# Patient Record
Sex: FEMALE | Race: WHITE | NOT HISPANIC OR LATINO | URBAN - METROPOLITAN AREA
[De-identification: names, ages, dates, MRNs, and addresses within clinical notes are randomized per-mention and may not be internally consistent; named-entity substitution may affect disease eponyms.]

---

## 2017-08-18 ENCOUNTER — OFFICE VISIT (OUTPATIENT)
Dept: SURGERY | Facility: CLINIC | Age: 32
End: 2017-08-18

## 2017-08-18 VITALS
DIASTOLIC BLOOD PRESSURE: 71 MMHG | WEIGHT: 173.2 LBS | TEMPERATURE: 98.4 F | SYSTOLIC BLOOD PRESSURE: 108 MMHG | OXYGEN SATURATION: 100 % | BODY MASS INDEX: 30.69 KG/M2 | HEART RATE: 62 BPM | HEIGHT: 63 IN

## 2017-08-18 DIAGNOSIS — Z15.09 BRCA2 GENETIC CARRIER: Primary | ICD-10-CM

## 2017-08-18 DIAGNOSIS — Z80.3 FH: BREAST CANCER: ICD-10-CM

## 2017-08-18 DIAGNOSIS — Z15.01 BRCA2 GENETIC CARRIER: Primary | ICD-10-CM

## 2017-08-18 PROCEDURE — 99212 OFFICE O/P EST SF 10 MIN: CPT | Performed by: SURGERY

## 2017-08-18 NOTE — PROGRESS NOTES
Chief Complaint: Mirtha Mckay is a 31 y.o. female who was seen in consultation at the request of Martina Rubio DO  for BRCA2 genetic carrier  and a followup visit    History of Present Illness:  Patient presents with a newly diagnosed  mutation in BRCA 2 gene.  This is a mutation caused by a rearrangement in the gene.   She has noted no masses, skin changes, nipple discharge, nipple changes prior to her most recent imaging in either breast  Her most recent imaging includes:  12        [facility]        Mammo Screening Bilateral        Mirtha Mckay   BIRADS 2    13      [facility]       MR breast Bilateral        Mirtha Mckay  There is severs bilateral background parenchymal enhancement.   There is no discrete mass or focal enhancing mass.   There is no non-mass-like enhancement identified. There is no abnormal lymphadenopathy.  IMPRESSION: No evidence to suggest malignancy.  MRI Benign-BIRADS 2    She has not had  a breast biopsy in the past.  She has had her uterus and ovaries removed this year in  , is postmenopausal, and takes no hormones.  Her family history includes the following:  She has one daughter, one sister, one brother, one maternal aunt, 2 maternal uncles, no paternal aunts. Her sister has had testing (Kandy Lima) that is pending, her mother had breast cancer at age 27 and  from this at age 31. Mirtha was 8 when her mother .  Her MA had some sort of cancer after her mother had breast cancer, but she is unsure what kind. Her MGM also had some sort of cancer, but she is unsure as to what kind. One maternal uncle lived in [facility] and is , she is unsure of cause. The other uncle has not had cancer that she is aware of. Her brother has not yet had testing and is unsure as to whether he is interested in this.    No other breast or ovarian cancer in her family.    Since our last visit, Mrs. Mckay has done well. She has noted no changes in her breast exam. No new  masses, skin changes nipple changes, nipple discharge either breast.  She met with Dr Lyles this morning and they discussed bilateral expander reconstruction. SHe liked him quite a bit and they discussed the date of 11-14-13.  No new imaging.  Her sister was tested and found to be negative for mutation.      We went to the operating room on 11-14-13. Pathology from 11-14-13 bilateral risk reducing mastectomy returned as bilateral benign breast tissue , bilateral negative sentinel nodes, LEFT 0/2, RIGHT 0/4.   She reports taking tylenol only after several days.    In the interim, Mrs. Rodriguez has done well. She has had her expanders fully expanded.   She has noted no skin nodules or discolorations.    In the interim,  Mirtha  has done well.  She had her expanders replaced with implants in the summer, then had her NAC reconstruction 2-2016.  She has noted no non-surgical changes in her reconstructed breast exam. No new masses, skin changes,  nodules or discolor ations either breast.   She denies headache, bone pain, belly pain, cough, changes in vision or gait.  She has intentionally lost 10#.      In the interim,  Mirtha Mckay  has done well.  She has noted no changes in her reconstructed breast exam. No new masses, skin changes, nodules or discolorations either side.  She denies headache, bone pain, belly pain, cough, changes in vision or gait.    She has gained 10 pounds.    Her nipple tattooing has faded.      Interval History:  In the interim,  Mirtha Mckay  has done well.  She has noted no changes in her reconstructed breast exam. No new masses, skin changes, either side.   She denies headache, bone pain, belly pain, cough, changes in vision or gait.She     She has gained 9 # in the interim, but tells me that this is 4# down from her heaviest.      Review of Systems:  Review of Systems   Constitutional: Negative.    All other systems reviewed and are negative.       Past Medical and Surgical History:  Breast  Biopsy History:  Patient had not had a breast biopsy prior to her cancer diagnosis. and Patient has not had a breast biopsy in the past.  Breast Cancer HIstory:  Patient does not have a past medical history of breast cancer.  Breast Operations, and year:  As per hpi  Obstetric/Gynecologic History:  Age menstrual periods began:14  Patient is premenopausal  Number of pregnancies:1  Number of live births: 1  Number of abortions or miscarriages: 0  Age of delivery of first child: 23  Patient did not breast feed. and Patient breast fed, for the following lenth of time: pump for 6-8 weeks  Length of time taking birth control pills:1-2 years implanon for 3 years  Patient has never taken hormone replacement      Past Surgical History:   Procedure Laterality Date   • CERVICAL CONE BIOPSY     • CYST REMOVAL     • HYSTERECTOMY  06/06/2013   • TONSILLECTOMY           Past Medical History:   Diagnosis Date   • Gene mutation     brca 2 mutation       Prior Hospitalizations, other than for surgery or childbirth, and year:  none      Social History     Social History   • Marital status:      Spouse name: N/A   • Number of children: N/A   • Years of education: N/A     Occupational History   • Not on file.     Social History Main Topics   • Smoking status: Never Smoker   • Smokeless tobacco: Not on file   • Alcohol use No   • Drug use: No   • Sexual activity: Yes     Partners: Male     Other Topics Concern   • Not on file     Social History Narrative     Patient is .  Patient is employed full time with the following occupation: childcare at   Patient drinks 3-4 servings of caffeine per day.      Family History:  Family History   Problem Relation Age of Onset   • Breast cancer Mother 27   • Lung cancer Mother    • Hypertension Father    • Hypertension Sister    • Cancer Maternal Grandmother    • Stroke Paternal Grandmother    • Diabetes Other    • Cancer Maternal Aunt        Vital Signs:  /71 (BP Location:  "Left arm, Patient Position: Sitting, Cuff Size: Adult)  Pulse 62  Temp 98.4 °F (36.9 °C) (Temporal Artery )   Ht 63\" (160 cm)  Wt 173 lb 3.2 oz (78.6 kg)  SpO2 100%  BMI 30.68 kg/m2     Medications:    Current Outpatient Prescriptions:   •  omeprazole (PriLOSEC) 20 MG capsule, , Disp: , Rfl:   •  Phentermine HCl 15 MG tablet dispersible, Take  by mouth., Disp: , Rfl:      Allergies:  No Known Allergies    Physical Examination:  /71 (BP Location: Left arm, Patient Position: Sitting, Cuff Size: Adult)  Pulse 62  Temp 98.4 °F (36.9 °C) (Temporal Artery )   Ht 63\" (160 cm)  Wt 173 lb 3.2 oz (78.6 kg)  SpO2 100%  BMI 30.68 kg/m2  General Appearance:  Patient is in no distress.  She is well kept and has a  average  build.   Psychiatric:  Patient with appropriate mood and affect. Alert and oriented to self, time, and place.    Breast, RIGHT: surgically absent with implants in place. Mastectomy flaps well healed and well perfused. No nodules or discolorations on the skin.  Nipple areolar complex is reconstructed and tattooed.  The tattooing has faded in random portions of the nipple areolar complex. Stable.    Breast, LEFT:  surgically absent with implants  in place. Mastectomy flaps well healed and well perfused. No nodules or discolorations on the skin.  Nipple areolar complex is reconstructed and tattooed.  The tattooing has faded in random portions of the nipple areolar complex. Stable.      Lymphatic:  There is no axillary, cervical, infraclavicular, or supraclavicular adenopathy bilaterally.  Eyes:  Pupils are round and reactive to light.  Cardiovascular:  Heart rate and rhythm are regular.  Respiratory:  Lungs are clear bilaterally with no crackles or wheezes in any lung field.  Gastrointestinal:  Abdomen is soft, nondistended, and nontender. There are scars visible from previous operations, including  well healed trocar incisions from her [facility]..   Musculoskeletal:  Good strength in all 4 " "extremities.  There is good range of motion in both shoulders.   Skin:  No new skin lesions or rashes on the skin excluding the breast (see breast exam above).        Imagin12        [facility]        Mammo Screening Bilateral        Mirtha Mckay   BIRADS 2    13      [facility]       MR breast Bilateral        Mirhta Mckay  There is severs bilateral background parenchymal enhancement.   There is no discrete mass or focal enhancing mass.   There is no non-mass-like enhancement identified. There is no abnormal lymphadenopathy.  IMPRESSION: No evidence to suggest malignancy.  MRI Benign-BIRADS 2        Pathology:  11/15/2013  BILATERAL TOTAL MASTECTOMY- RISK REDUCING BILAT DEEP AXILLARY SENTINEL  NODE BIOPSY  Final Diagnosis  1:  SENTINEL LYMPH NODE #1, LEFT AXILLA, EXCISIONAL SPECIMEN (ONE NODE):       NO TUMOR IDENTIFIED.    2:  SENTINEL LYMPH NODE #2, LEFT AXILLA, EXCISIONAL SPECIMEN (ONE NODE):       NO TUMOR IDENTIFIED.    3:  SENTINEL LYMPH NODE #1, RIGHT AXILLA, EXCISIONAL SPECIMEN (TWO NODES):       SMALLER NODE: NO TUMOR IDENTIFIED.            LARGER NODE: NO TUMOR IDENTIFIED.    4:  SENTINEL LYMPH NODE #2, RIGHT AXILLA, EXCISIONAL SPECIMEN (TWO NODES):       SMALLER NODE: NO TUMOR IDENTIFIED.       LARGER NODE: NO TUMOR IDENTIFIED.    COMMENT: Step sections and routine stains have been utilized on all nodes.     Procedures/Addenda  SUPPLEMENTAL REPORT     Date Ordered:     11/15/2013     Status:  Signed Out       Date Complete:     11/15/2013     By:  TONNY MOON       Date Reported:     2013             Addendum Diagnosis  5: \"RISK REDUCING RIGHT TOTAL MASTECTOMY\":       MINIMAL FIBROSIS.       NEGATIVE SKIN / NIPPLE.    6 \"RISK REDUCING LEFT TOTAL MASTECTOMY\":       MINIMAL FIBROSIS.       NEGATIVE SKIN / NIPPLE.    Labs:    12      [facility]     Comprehensive BRACAnalysis        Mirtha Mckay   NO MUTATION DETECTED    12      [facility]         BRACAnalysis Rearrangement Test    "   Mirtha Mckay   POSITIVE FOR A DELETERIOUS MUTATION  BRCA2 Full gene rearrangement Deleterious      Procedures:      Assessment:   Diagnosis Plan   1. BRCA2 genetic carrier     2. FH: breast cancer          1-  BRCA 2 rearrangement deleterious mutation 12. Pathology from 13 bilateral risk reducing mastectomy returned as bilateral benign breast tissue , bilateral negative sentinel nodes, LEFT 0/2, RIGHT 0/4.  Reconstruction Dr. Banks, gel implants.  - LEONARDPHILLIP, Dr. Rubio    2-  mother, age 27,  from this at age 31        Plan:  Mirtha Mckay and I reviewed her interval history, exam,  together today. Her exam is in good order.    Today is her 3 and a half year postoperative visit.  I will see her back in 1 year with no imaging.   She has had her uterus and ovaries removed.    I have asked her to continue her self exam monthly  in addition to annual clinical breast exam and to call in the interim with concerns or changes.      Zoya Vásquez MD        Next Appointment:  Return for Next scheduled follow up, no imaging.      EMR Dragon/transcription disclaimer:    Much of this encounter note is an electronic transcription/translocation of spoken language to printed text.  The electronic translation of spoken language may permit erroneous, or at times, nonsensical words or phrases to be inadvertently transcribed.  Although I have reviewed the note from such areas, some may still exist.

## 2018-08-21 ENCOUNTER — TELEPHONE (OUTPATIENT)
Dept: SURGERY | Facility: CLINIC | Age: 33
End: 2018-08-21

## 2018-08-24 ENCOUNTER — OFFICE VISIT (OUTPATIENT)
Dept: SURGERY | Facility: CLINIC | Age: 33
End: 2018-08-24

## 2018-08-24 VITALS
WEIGHT: 187 LBS | OXYGEN SATURATION: 97 % | HEART RATE: 70 BPM | HEIGHT: 63 IN | SYSTOLIC BLOOD PRESSURE: 128 MMHG | DIASTOLIC BLOOD PRESSURE: 76 MMHG | BODY MASS INDEX: 33.13 KG/M2

## 2018-08-24 DIAGNOSIS — Z15.09 BRCA2 GENETIC CARRIER: Primary | ICD-10-CM

## 2018-08-24 DIAGNOSIS — Z80.3 FH: BREAST CANCER: ICD-10-CM

## 2018-08-24 DIAGNOSIS — Z15.01 BRCA2 GENETIC CARRIER: Primary | ICD-10-CM

## 2018-08-24 PROCEDURE — 99212 OFFICE O/P EST SF 10 MIN: CPT | Performed by: SURGERY

## 2018-08-24 NOTE — PROGRESS NOTES
Chief Complaint: Mirtha Mckay is a 32 y.o. female who was seen in consultation at the request of Martina Rubio DO  for BRCA2 genetic carrier  and a followup visit    History of Present Illness:  Patient presents with a newly diagnosed  mutation in BRCA 2 gene.  This is a mutation caused by a rearrangement in the gene.   She has noted no masses, skin changes, nipple discharge, nipple changes prior to her most recent imaging in either breast  Her most recent imaging includes:  12        [facility]        Mammo Screening Bilateral        Mirtha Mckay   BIRADS 2    13      [facility]       MR breast Bilateral        Mirtha Mckay  There is severs bilateral background parenchymal enhancement.   There is no discrete mass or focal enhancing mass.   There is no non-mass-like enhancement identified. There is no abnormal lymphadenopathy.  IMPRESSION: No evidence to suggest malignancy.  MRI Benign-BIRADS 2    She has not had  a breast biopsy in the past.  She has had her uterus and ovaries removed this year in  , is postmenopausal, and takes no hormones.  Her family history includes the following:  She has one daughter, one sister, one brother, one maternal aunt, 2 maternal uncles, no paternal aunts. Her sister has had testing (Kandy Lima) that is pending, her mother had breast cancer at age 27 and  from this at age 31. Mirtha was 8 when her mother .  Her MA had some sort of cancer after her mother had breast cancer, but she is unsure what kind. Her MGM also had some sort of cancer, but she is unsure as to what kind. One maternal uncle lived in [place] and is , she is unsure of cause. The other uncle has not had cancer that she is aware of. Her brother has not yet had testing and is unsure as to whether he is interested in this.    No other breast or ovarian cancer in her family.    Since our last visit, Mrs. Mckay has done well. She has noted no changes in her breast exam. No new  masses, skin changes nipple changes, nipple discharge either breast.  She met with Dr Lyles this morning and they discussed bilateral expander reconstruction. SHe liked him quite a bit and they discussed the date of 11-14-13.  No new imaging.  Her sister was tested and found to be negative for mutation.      We went to the operating room on 11-14-13. Pathology from 11-14-13 bilateral risk reducing mastectomy returned as bilateral benign breast tissue , bilateral negative sentinel nodes, LEFT 0/2, RIGHT 0/4.   She reports taking tylenol only after several days.    In the interim, Mrs. Rodriguez has done well. She has had her expanders fully expanded.   She has noted no skin nodules or discolorations.    In the interim,  Mirtha  has done well.  She had her expanders replaced with implants in the summer, then had her NAC reconstruction 2-2016.  She has noted no non-surgical changes in her reconstructed breast exam. No new masses, skin changes,  nodules or discolor ations either breast.   She denies headache, bone pain, belly pain, cough, changes in vision or gait.  She has intentionally lost 10#.      In the interim,  Mirtha Mckay  has done well.  She has noted no changes in her reconstructed breast exam. No new masses, skin changes, nodules or discolorations either side.  She denies headache, bone pain, belly pain, cough, changes in vision or gait.    She has gained 10 pounds.    Her nipple tattooing has faded.    In the interim,  Mirtha Mckay  has done well.  She has noted no changes in her reconstructed breast exam. No new masses, skin changes, either side.   She denies headache, bone pain, belly pain, cough, changes in vision or gait.She     She has gained 9 # in the interim, but tells me that this is 4# down from her heaviest.        Interval History:  In the interim,  Mirtha Mckay  has done well. She had her NAC tattoed at a tattoo parlor in [facility] called [facility], artist named Christian.    She has noted no changes in  her reconstructed breast exam. No new masses, nodules or discolorations either side.    She denies headache, bone pain, belly pain, cough, changes in vision or gait.      Review of Systems:  Review of Systems   Constitutional: Positive for unexpected weight change (14 lb wt gain).   All other systems reviewed and are negative.       Past Medical and Surgical History:  Breast Biopsy History:  Patient had not had a breast biopsy prior to her cancer diagnosis. and Patient has not had a breast biopsy in the past.  Breast Cancer HIstory:  Patient does not have a past medical history of breast cancer.  Breast Operations, and year:  As per hpi  Obstetric/Gynecologic History:  Age menstrual periods began:14  Patient is premenopausal  Number of pregnancies:1  Number of live births: 1  Number of abortions or miscarriages: 0  Age of delivery of first child: 23  Patient did not breast feed. and Patient breast fed, for the following lenth of time: pump for 6-8 weeks  Length of time taking birth control pills:1-2 years implanon for 3 years  Patient has never taken hormone replacement      Past Surgical History:   Procedure Laterality Date   • CERVICAL CONE BIOPSY     • CYST REMOVAL     • HYSTERECTOMY  06/06/2013   • TONSILLECTOMY           Past Medical History:   Diagnosis Date   • Gene mutation     brca 2 mutation       Prior Hospitalizations, other than for surgery or childbirth, and year:  none      Social History     Social History   • Marital status:      Spouse name: N/A   • Number of children: N/A   • Years of education: N/A     Occupational History   • Not on file.     Social History Main Topics   • Smoking status: Never Smoker   • Smokeless tobacco: Not on file   • Alcohol use No   • Drug use: No   • Sexual activity: Yes     Partners: Male     Other Topics Concern   • Not on file     Social History Narrative   • No narrative on file     Patient is .  Patient is employed full time with the following  "occupation: childcare at   Patient drinks 3-4 servings of caffeine per day.      Family History:  Family History   Problem Relation Age of Onset   • Breast cancer Mother 27   • Lung cancer Mother    • Hypertension Father    • Hypertension Sister    • Cancer Maternal Grandmother    • Stroke Paternal Grandmother    • Diabetes Other    • Cancer Maternal Aunt        Vital Signs:  /76   Pulse 70   Ht 160 cm (63\")   Wt 84.8 kg (187 lb)   SpO2 97%   BMI 33.13 kg/m²      Medications:    Current Outpatient Prescriptions:   •  omeprazole (PriLOSEC) 20 MG capsule, , Disp: , Rfl:      Allergies:  No Known Allergies    Physical Examination:  /76   Pulse 70   Ht 160 cm (63\")   Wt 84.8 kg (187 lb)   SpO2 97%   BMI 33.13 kg/m²   General Appearance:  Patient is in no distress.  She is well kept and has a  average  build.   Psychiatric:  Patient with appropriate mood and affect. Alert and oriented to self, time, and place.    Breast, RIGHT: surgically absent with implants in place. Mastectomy flaps well healed and well perfused. No nodules or discolorations on the skin.  Nipple areolar complex is reconstructed and tattooed. She has a color/pigment  tattoo on the recontructed NAC, but also an intricate floral tattoo hiding her inferior incisions.    Breast, LEFT:  surgically absent with implants  in place. Mastectomy flaps well healed and well perfused. No nodules or discolorations on the skin.  Nipple areolar complex is reconstructed and tattooed. She has a color/pigment  tattoo on the recontructed NAC, but also an intricate floral tattoo hiding her inferior incisions.    Lymphatic:  There is no axillary, cervical, infraclavicular, or supraclavicular adenopathy bilaterally.  Eyes:  Pupils are round and reactive to light.  Cardiovascular:  Heart rate and rhythm are regular.  Respiratory:  Lungs are clear bilaterally with no crackles or wheezes in any lung field.  Gastrointestinal:  Abdomen is soft, " "nondistended, and nontender. There are scars visible from previous operations, including  well healed trocar incisions from her TAHBSO..   Musculoskeletal:  Good strength in all 4 extremities.  There is good range of motion in both shoulders.   Skin:  No new skin lesions or rashes on the skin excluding the breast (see breast exam above).        Imagin12        [facility]        Mammo Screening Bilateral        Mirtha Mckay   BIRADS 2    13      [facility]       MR breast Bilateral        Mirtha Mckay  There is severs bilateral background parenchymal enhancement.   There is no discrete mass or focal enhancing mass.   There is no non-mass-like enhancement identified. There is no abnormal lymphadenopathy.  IMPRESSION: No evidence to suggest malignancy.  MRI Benign-BIRADS 2        Pathology:  11/15/2013  BILATERAL TOTAL MASTECTOMY- RISK REDUCING BILAT DEEP AXILLARY SENTINEL  NODE BIOPSY  Final Diagnosis  1:  SENTINEL LYMPH NODE #1, LEFT AXILLA, EXCISIONAL SPECIMEN (ONE NODE):       NO TUMOR IDENTIFIED.    2:  SENTINEL LYMPH NODE #2, LEFT AXILLA, EXCISIONAL SPECIMEN (ONE NODE):       NO TUMOR IDENTIFIED.    3:  SENTINEL LYMPH NODE #1, RIGHT AXILLA, EXCISIONAL SPECIMEN (TWO NODES):       SMALLER NODE: NO TUMOR IDENTIFIED.            LARGER NODE: NO TUMOR IDENTIFIED.    4:  SENTINEL LYMPH NODE #2, RIGHT AXILLA, EXCISIONAL SPECIMEN (TWO NODES):       SMALLER NODE: NO TUMOR IDENTIFIED.       LARGER NODE: NO TUMOR IDENTIFIED.    COMMENT: Step sections and routine stains have been utilized on all nodes.     Procedures/Addenda  SUPPLEMENTAL REPORT     Date Ordered:     11/15/2013     Status:  Signed Out       Date Complete:     11/15/2013     By:  TONNY MOON       Date Reported:     2013             Addendum Diagnosis  5: \"RISK REDUCING RIGHT TOTAL MASTECTOMY\":       MINIMAL FIBROSIS.       NEGATIVE SKIN / NIPPLE.    6 \"RISK REDUCING LEFT TOTAL MASTECTOMY\":       MINIMAL FIBROSIS.       NEGATIVE SKIN / " NIPPLE.    Labs:    12      [facility]     Comprehensive BRACAnalysis        Mirtha Mckay   NO MUTATION DETECTED    12      [facility]         BRACAnalysis Rearrangement Test      Mirtha Mckay   POSITIVE FOR A DELETERIOUS MUTATION  BRCA2 Full gene rearrangement Deleterious      Procedures:      Assessment:   Diagnosis Plan   1. BRCA2 genetic carrier     2. FH: breast cancer          1-  BRCA 2 rearrangement deleterious mutation 12. Pathology from 13 bilateral risk reducing mastectomy returned as bilateral benign breast tissue , bilateral negative sentinel nodes, LEFT 0/2, RIGHT 0/4.  Reconstruction Dr. Banks, gel implants.  - TAHBSO, Dr. Rubio    2-  mother, age 27,  from this at age 31        Plan:  Mirtha Mckay and I reviewed her interval history, exam,  together today. Her exam is in good order.    Today is her nearly 5 year postoperative visit.  I will see her back in 1 year with no imaging.   She has had her uterus and ovaries removed. I did ask her to followup with Dr Rubio for her ovarian-primary peritoneal cancer risk.    I have asked her to continue her self exam monthly  in addition to annual clinical breast exam and to call in the interim with concerns or changes.      Zoya Vásquez MD        Next Appointment:  Return in about 1 year (around 2019) for no imaging.      EMR Dragon/transcription disclaimer:    Much of this encounter note is an electronic transcription/translocation of spoken language to printed text.  The electronic translation of spoken language may permit erroneous, or at times, nonsensical words or phrases to be inadvertently transcribed.  Although I have reviewed the note from such areas, some may still exist.

## 2019-09-06 ENCOUNTER — OFFICE VISIT (OUTPATIENT)
Dept: SURGERY | Facility: CLINIC | Age: 34
End: 2019-09-06

## 2019-09-06 VITALS
HEART RATE: 84 BPM | OXYGEN SATURATION: 98 % | WEIGHT: 195 LBS | SYSTOLIC BLOOD PRESSURE: 146 MMHG | BODY MASS INDEX: 34.55 KG/M2 | HEIGHT: 63 IN | DIASTOLIC BLOOD PRESSURE: 82 MMHG

## 2019-09-06 DIAGNOSIS — Z15.09 BRCA2 GENETIC CARRIER: Primary | ICD-10-CM

## 2019-09-06 DIAGNOSIS — Z80.3 FH: BREAST CANCER: ICD-10-CM

## 2019-09-06 DIAGNOSIS — Z15.01 BRCA2 GENETIC CARRIER: Primary | ICD-10-CM

## 2019-09-06 PROCEDURE — 99212 OFFICE O/P EST SF 10 MIN: CPT | Performed by: SURGERY

## 2019-09-06 NOTE — PROGRESS NOTES
Chief Complaint: Mirtha Mckay is a 33 y.o. female who was seen in consultation at the request of  Martina Rubio DO   for BRCA2 genetic carrier  and a followup visit    History of Present Illness:  Patient presents with a newly diagnosed  mutation in BRCA 2 gene.  This is a mutation caused by a rearrangement in the gene.   She has noted no masses, skin changes, nipple discharge, nipple changes prior to her most recent imaging in either breast  Her most recent imaging includes:  12        [facility]        Mammo Screening Bilateral        Mirtha Mckay   BIRADS 2    13      [facility]       MR breast Bilateral        Mirtha Mckay  There is severs bilateral background parenchymal enhancement.   There is no discrete mass or focal enhancing mass.   There is no non-mass-like enhancement identified. There is no abnormal lymphadenopathy.  IMPRESSION: No evidence to suggest malignancy.  MRI Benign-BIRADS 2    She has not had  a breast biopsy in the past.  She has had her uterus and ovaries removed this year in  , is postmenopausal, and takes no hormones.  Her family history includes the following:  She has one daughter, one sister, one brother, one maternal aunt, 2 maternal uncles, no paternal aunts. Her sister has had testing (Kandy Lima) that is pending, her mother had breast cancer at age 27 and  from this at age 31. Mirtha was 8 when her mother .  Her MA had some sort of cancer after her mother had breast cancer, but she is unsure what kind. Her MGM also had some sort of cancer, but she is unsure as to what kind. One maternal uncle lived in [facility] and is , she is unsure of cause. The other uncle has not had cancer that she is aware of. Her brother has not yet had testing and is unsure as to whether he is interested in this.    No other breast or ovarian cancer in her family.    Since our last visit, Mrs. Mckay has done well. She has noted no changes in her breast exam. No new  masses, skin changes nipple changes, nipple discharge either breast.  She met with Dr Lyles this morning and they discussed bilateral expander reconstruction. SHe liked him quite a bit and they discussed the date of 11-14-13.  No new imaging.  Her sister was tested and found to be negative for mutation.      We went to the operating room on 11-14-13. Pathology from 11-14-13 bilateral risk reducing mastectomy returned as bilateral benign breast tissue , bilateral negative sentinel nodes, LEFT 0/2, RIGHT 0/4.   She reports taking tylenol only after several days.    In the interim, Mrs. Rodriguez has done well. She has had her expanders fully expanded.   She has noted no skin nodules or discolorations.    In the interim,  Mirtha  has done well.  She had her expanders replaced with implants in the summer, then had her NAC reconstruction 2-2016.  She has noted no non-surgical changes in her reconstructed breast exam. No new masses, skin changes,  nodules or discolor ations either breast.   She denies headache, bone pain, belly pain, cough, changes in vision or gait.  She has intentionally lost 10#.      In the interim,  Mirtha Mckay  has done well.  She has noted no changes in her reconstructed breast exam. No new masses, skin changes, nodules or discolorations either side.  She denies headache, bone pain, belly pain, cough, changes in vision or gait.    She has gained 10 pounds.    Her nipple tattooing has faded.    In the interim,  Mirtha Mckay  has done well.  She has noted no changes in her reconstructed breast exam. No new masses, skin changes, either side.   She denies headache, bone pain, belly pain, cough, changes in vision or gait.She     She has gained 9 # in the interim, but tells me that this is 4# down from her heaviest.      In the interim,  Mirtha Mckay  has done well. She had her NAC tattoed at a tattoo parlor in [facility] [address] called [employer], artist named Christian.    She has noted no changes in her reconstructed  breast exam. No new masses, nodules or discolorations either side.    She denies headache, bone pain, belly pain, cough, changes in vision or gait.    Interval History:      In the interim,  Mirtha Mckay  has done well.  She has noted no changes in her reconstructed breast exam. No new masses, nodules or discolorations either side.    She denies headache, bone pain, belly pain, cough, changes in vision or gait.  SHe has gained 7#.      Review of Systems:  Review of Systems   Constitutional: Positive for unexpected weight change (8 lb wt gain).   All other systems reviewed and are negative.       Past Medical and Surgical History:  Breast Biopsy History:  Patient had not had a breast biopsy prior to her cancer diagnosis. and Patient has not had a breast biopsy in the past.  Breast Cancer HIstory:  Patient does not have a past medical history of breast cancer.  Breast Operations, and year:  As per [facility]  Obstetric/Gynecologic History:  Age menstrual periods began:14  Patient is premenopausal  Number of pregnancies:1  Number of live births: 1  Number of abortions or miscarriages: 0  Age of delivery of first child: 23  Patient did not breast feed. and Patient breast fed, for the following lenth of time: pump for 6-8 weeks  Length of time taking birth control pills:1-2 years implanon for 3 years  Patient has never taken hormone replacement      Past Surgical History:   Procedure Laterality Date   • CERVICAL CONE BIOPSY     • CYST REMOVAL     • HYSTERECTOMY  06/06/2013   • TONSILLECTOMY           Past Medical History:   Diagnosis Date   • Gene mutation     brca 2 mutation       Prior Hospitalizations, other than for surgery or childbirth, and year:  none      Social History     Socioeconomic History   • Marital status:      Spouse name: Not on file   • Number of children: Not on file   • Years of education: Not on file   • Highest education level: Not on file   Tobacco Use   • Smoking status: Never Smoker  "  Substance and Sexual Activity   • Alcohol use: No   • Drug use: No   • Sexual activity: Yes     Partners: Male     Patient is .  Patient is employed full time with the following occupation: childcare at   Patient drinks 3-4 servings of caffeine per day.      Family History:  Family History   Problem Relation Age of Onset   • Breast cancer Mother 27   • Lung cancer Mother    • Hypertension Father    • Hypertension Sister    • Cancer Maternal Grandmother    • Stroke Paternal Grandmother    • Diabetes Other    • Cancer Maternal Aunt        Vital Signs:  /82 (BP Location: Right arm, Patient Position: Sitting, Cuff Size: Adult)   Pulse 84   Ht 160 cm (63\")   Wt 88.5 kg (195 lb)   LMP  (LMP Unknown)   SpO2 98%   Breastfeeding? No   BMI 34.54 kg/m²      Medications:    Current Outpatient Medications:   •  omeprazole (PriLOSEC) 20 MG capsule, , Disp: , Rfl:      Allergies:  No Known Allergies    Physical Examination:  /82 (BP Location: Right arm, Patient Position: Sitting, Cuff Size: Adult)   Pulse 84   Ht 160 cm (63\")   Wt 88.5 kg (195 lb)   LMP  (LMP Unknown)   SpO2 98%   Breastfeeding? No   BMI 34.54 kg/m²   General Appearance:  Patient is in no distress.  She is well kept and has a  average  build.   Psychiatric:  Patient with appropriate mood and affect. Alert and oriented to self, time, and place.    Breast, RIGHT: surgically absent with implants in place. Mastectomy flaps well healed and well perfused. No nodules or discolorations on the skin.  Nipple areolar complex is reconstructed and tattooed. She has a color/pigment  tattoo on the recontructed NAC, but also an intricate floral tattoo hiding her inferior incisions.    Breast, LEFT:  surgically absent with implants  in place. Mastectomy flaps well healed and well perfused. No nodules or discolorations on the skin.  Nipple areolar complex is reconstructed and tattooed. She has a color/pigment  tattoo on the recontructed " NAC, but also an intricate floral tattoo hiding her inferior incisions.    Lymphatic:  There is no axillary, cervical, infraclavicular, or supraclavicular adenopathy bilaterally.  Eyes:  Pupils are round and reactive to light.  Cardiovascular:  Heart rate and rhythm are regular.  Respiratory:  Lungs are clear bilaterally with no crackles or wheezes in any lung field.  Gastrointestinal:  Abdomen is soft, nondistended, and nontender. There are scars visible from previous operations, including  well healed trocar incisions from her TAHBSO..   Musculoskeletal:  Good strength in all 4 extremities.  There is good range of motion in both shoulders.   Skin:  No new skin lesions or rashes on the skin excluding the breast (see breast exam above).        Imagin12        [facility]        Mammo Screening Bilateral        Mirtha Mckay   BIRADS 2    13      [facility]       MR breast Bilateral        Mirtha Mckay  There is severs bilateral background parenchymal enhancement.   There is no discrete mass or focal enhancing mass.   There is no non-mass-like enhancement identified. There is no abnormal lymphadenopathy.  IMPRESSION: No evidence to suggest malignancy.  MRI Benign-BIRADS 2        Pathology:  11/15/2013  BILATERAL TOTAL MASTECTOMY- RISK REDUCING BILAT DEEP AXILLARY SENTINEL  NODE BIOPSY  Final Diagnosis  1:  SENTINEL LYMPH NODE #1, LEFT AXILLA, EXCISIONAL SPECIMEN (ONE NODE):       NO TUMOR IDENTIFIED.    2:  SENTINEL LYMPH NODE #2, LEFT AXILLA, EXCISIONAL SPECIMEN (ONE NODE):       NO TUMOR IDENTIFIED.    3:  SENTINEL LYMPH NODE #1, RIGHT AXILLA, EXCISIONAL SPECIMEN (TWO NODES):       SMALLER NODE: NO TUMOR IDENTIFIED.            LARGER NODE: NO TUMOR IDENTIFIED.    4:  SENTINEL LYMPH NODE #2, RIGHT AXILLA, EXCISIONAL SPECIMEN (TWO NODES):       SMALLER NODE: NO TUMOR IDENTIFIED.       LARGER NODE: NO TUMOR IDENTIFIED.    COMMENT: Step sections and routine stains have been utilized on all nodes.    "  Procedures/Addenda  SUPPLEMENTAL REPORT     Date Ordered:     11/15/2013     Status:  Signed Out       Date Complete:     11/15/2013     By:  TONNY MOON       Date Reported:     2013             Addendum Diagnosis  5: \"RISK REDUCING RIGHT TOTAL MASTECTOMY\":       MINIMAL FIBROSIS.       NEGATIVE SKIN / NIPPLE.    6 \"RISK REDUCING LEFT TOTAL MASTECTOMY\":       MINIMAL FIBROSIS.       NEGATIVE SKIN / NIPPLE.    Labs:    12      [facility]     Comprehensive BRACAnalysis        Mirtha Mckay   NO MUTATION DETECTED    12      [facility]         BRACAnalysis Rearrangement Test      Mirtha Mckay   POSITIVE FOR A DELETERIOUS MUTATION  BRCA2 Full gene rearrangement Deleterious      Procedures:      Assessment:   Diagnosis Plan   1. BRCA2 genetic carrier     2. FH: breast cancer          1-  BRCA 2 rearrangement deleterious mutation 12. Pathology from 13 bilateral risk reducing mastectomy returned as bilateral benign breast tissue , bilateral negative sentinel nodes, LEFT 0/2, RIGHT 0/4.  Reconstruction Dr. Banks, gel implants.  - [facility], Dr. Rubio    2-  mother, age 27,  from this at age 31        Plan:  Mirtha DURAND Nely and I reviewed her interval history, exam,  together today. Her exam is in good order.    Today is her nearly 6 year postoperative visit.  I will see her back in 1 year with no imaging.   She has had her uterus and ovaries removed. I did ask her to followup with Dr Rubio for her ovarian-primary peritoneal cancer risk.    I have asked her to continue her self exam monthly  in addition to annual clinical breast exam and to call in the interim with concerns or changes.      Zoya Vásquez MD        Next Appointment:  Return in about 1 year (around 2020) for no imaging.      EMR Dragon/transcription disclaimer:    Much of this encounter note is an electronic transcription/translocation of spoken language to printed text.  The electronic translation of spoken " language may permit erroneous, or at times, nonsensical words or phrases to be inadvertently transcribed.  Although I have reviewed the note from such areas, some may still exist.

## 2020-09-01 ENCOUNTER — OFFICE VISIT (OUTPATIENT)
Dept: SURGERY | Facility: CLINIC | Age: 35
End: 2020-09-01

## 2020-09-01 VITALS
BODY MASS INDEX: 33.84 KG/M2 | WEIGHT: 191 LBS | HEART RATE: 90 BPM | OXYGEN SATURATION: 98 % | DIASTOLIC BLOOD PRESSURE: 81 MMHG | SYSTOLIC BLOOD PRESSURE: 120 MMHG | TEMPERATURE: 98 F | HEIGHT: 63 IN

## 2020-09-01 DIAGNOSIS — Z15.01 BRCA2 GENETIC CARRIER: Primary | ICD-10-CM

## 2020-09-01 DIAGNOSIS — Z80.3 FH: BREAST CANCER: ICD-10-CM

## 2020-09-01 DIAGNOSIS — Z15.09 BRCA2 GENETIC CARRIER: Primary | ICD-10-CM

## 2020-09-01 PROCEDURE — 99212 OFFICE O/P EST SF 10 MIN: CPT | Performed by: SURGERY

## 2020-09-01 NOTE — PROGRESS NOTES
Chief Complaint: Mirtha Mckay is a 34 y.o. female who was seen in consultation at the request of  Martina Rubio DO   for BRCA2 genetic carrier  and a followup visit    History of Present Illness:  Patient presents with a newly diagnosed  mutation in BRCA 2 gene.  This is a mutation caused by a rearrangement in the gene.   She has noted no masses, skin changes, nipple discharge, nipple changes prior to her most recent imaging in either breast  Her most recent imaging includes:  12        [facility]        Mammo Screening Bilateral        Mirtha Mckay   BIRADS 2    13      [facility]       MR breast Bilateral        Mirtha Mckay  There is severs bilateral background parenchymal enhancement.   There is no discrete mass or focal enhancing mass.   There is no non-mass-like enhancement identified. There is no abnormal lymphadenopathy.  IMPRESSION: No evidence to suggest malignancy.  MRI Benign-BIRADS 2    She has not had  a breast biopsy in the past.  She has had her uterus and ovaries removed this year in  , is postmenopausal, and takes no hormones.  Her family history includes the following:  She has one daughter, one sister, one brother, one maternal aunt, 2 maternal uncles, no paternal aunts. Her sister has had testing (Kandy Lima) that is pending, her mother had breast cancer at age 27 and  from this at age 31. Mirtha was 8 when her mother .  Her MA had some sort of cancer after her mother had breast cancer, but she is unsure what kind. Her MGM also had some sort of cancer, but she is unsure as to what kind. One maternal uncle lived in [facility] and is , she is unsure of cause. The other uncle has not had cancer that she is aware of. Her brother has not yet had testing and is unsure as to whether he is interested in this.    No other breast or ovarian cancer in her family.    Since our last visit, Mrs. Mckay has done well. She has noted no changes in her breast exam. No new  masses, skin changes nipple changes, nipple discharge either breast.  She met with Dr Lyles this morning and they discussed bilateral expander reconstruction. SHe liked him quite a bit and they discussed the date of 11-14-13.  No new imaging.  Her sister was tested and found to be negative for mutation.      We went to the operating room on 11-14-13. Pathology from 11-14-13 bilateral risk reducing mastectomy returned as bilateral benign breast tissue , bilateral negative sentinel nodes, LEFT 0/2, RIGHT 0/4.   She reports taking tylenol only after several days.    In the interim, Mrs. Rodriguez has done well. She has had her expanders fully expanded.   She has noted no skin nodules or discolorations.    In the interim,  Mirtha  has done well.  She had her expanders replaced with implants in the summer, then had her NAC reconstruction 2-2016.  She has noted no non-surgical changes in her reconstructed breast exam. No new masses, skin changes,  nodules or discolor ations either breast.   She denies headache, bone pain, belly pain, cough, changes in vision or gait.  She has intentionally lost 10#.      In the interim,  Mirtha Mckay  has done well.  She has noted no changes in her reconstructed breast exam. No new masses, skin changes, nodules or discolorations either side.  She denies headache, bone pain, belly pain, cough, changes in vision or gait.    She has gained 10 pounds.    Her nipple tattooing has faded.    In the interim,  Mirtha Mckay  has done well.  She has noted no changes in her reconstructed breast exam. No new masses, skin changes, either side.   She denies headache, bone pain, belly pain, cough, changes in vision or gait.She     She has gained 9 # in the interim, but tells me that this is 4# down from her heaviest.      In the interim,  Mirtha Mckay  has done well. She had her NAC tattoed at a tattoo parlor in [facility] called [employer], artist named Christian.    She has noted no changes in her reconstructed  breast exam. No new masses, nodules or discolorations either side.    She denies headache, bone pain, belly pain, cough, changes in vision or gait.          In the interim,  Mirtha Mckay  has done well.  She has noted no changes in her reconstructed breast exam. No new masses, nodules or discolorations either side.    She denies headache, bone pain, belly pain, cough, changes in vision or gait.  SHe has gained 7#.    Interval History:  In the interim,  Mirtha Mckay  has done well.  She has noted no changes in her reconstructed breast exam. No new masses, nodules or discolorations either side.    She denies headache, bone pain, belly pain, cough, changes in vision or gait.  She has lost 4 #.      Review of Systems:  Review of Systems   Constitutional: Negative for unexpected weight change (4 LB WT LOSS).   All other systems reviewed and are negative.       Past Medical and Surgical History:  Breast Biopsy History:  Patient had not had a breast biopsy prior to her cancer diagnosis. and Patient has not had a breast biopsy in the past.  Breast Cancer HIstory:  Patient does not have a past medical history of breast cancer.  Breast Operations, and year:  As per hpi  Obstetric/Gynecologic History:  Age menstrual periods began:14  Patient is premenopausal  Number of pregnancies:1  Number of live births: 1  Number of abortions or miscarriages: 0  Age of delivery of first child: 23  Patient did not breast feed. and Patient breast fed, for the following lenth of time: pump for 6-8 weeks  Length of time taking birth control pills:1-2 years implanon for 3 years  Patient has never taken hormone replacement      Past Surgical History:   Procedure Laterality Date   • CERVICAL CONE BIOPSY     • CYST REMOVAL     • HYSTERECTOMY  06/06/2013   • TONSILLECTOMY           Past Medical History:   Diagnosis Date   • Gene mutation     brca 2 mutation       Prior Hospitalizations, other than for surgery or childbirth, and  "year:  none      Social History     Socioeconomic History   • Marital status:      Spouse name: Not on file   • Number of children: Not on file   • Years of education: Not on file   • Highest education level: Not on file   Tobacco Use   • Smoking status: Never Smoker   Substance and Sexual Activity   • Alcohol use: No   • Drug use: No   • Sexual activity: Yes     Partners: Male     Patient is .  Patient is employed full time with the following occupation: childcare at   Patient drinks 3-4 servings of caffeine per day.      Family History:  Family History   Problem Relation Age of Onset   • Breast cancer Mother 27   • Lung cancer Mother    • Hypertension Father    • Hypertension Sister    • Cancer Maternal Grandmother    • Stroke Paternal Grandmother    • Diabetes Other    • Cancer Maternal Aunt        Vital Signs:  /81   Pulse 90   Temp 98 °F (36.7 °C)   Ht 160 cm (63\")   Wt 86.6 kg (191 lb)   LMP  (LMP Unknown)   SpO2 98%   Breastfeeding No   BMI 33.83 kg/m²      Medications:    Current Outpatient Medications:   •  omeprazole (PriLOSEC) 20 MG capsule, , Disp: , Rfl:      Allergies:  No Known Allergies    Physical Examination:  /81   Pulse 90   Temp 98 °F (36.7 °C)   Ht 160 cm (63\")   Wt 86.6 kg (191 lb)   LMP  (LMP Unknown)   SpO2 98%   Breastfeeding No   BMI 33.83 kg/m²   General Appearance:  Patient is in no distress.  She is well kept and has a  average  build.   Psychiatric:  Patient with appropriate mood and affect. Alert and oriented to self, time, and place.    Breast, RIGHT: surgically absent with implants in place. Mastectomy flaps well healed and well perfused. No nodules or discolorations on the skin.  Nipple areolar complex is reconstructed and tattooed. She has a color/pigment  tattoo on the recontructed NAC, but also an intricate floral tattoo hiding her inferior incisions. Dr Lyles did her NAC reconstruction and a  did the " pigment.    Breast, LEFT:  surgically absent with implants  in place. Mastectomy flaps well healed and well perfused. No nodules or discolorations on the skin.  Nipple areolar complex is reconstructed and tattooed. She has a color/pigment  tattoo on the recontructed NAC, but also an intricate floral tattoo hiding her inferior incisions. Dr Lyles did her NAC reconstruction and a  did the pigment.    Lymphatic:  There is no axillary, cervical, infraclavicular, or supraclavicular adenopathy bilaterally.  Eyes:  Pupils are round and reactive to light.  Cardiovascular:  Heart rate and rhythm are regular.  Respiratory:  Lungs are clear bilaterally with no crackles or wheezes in any lung field.  Gastrointestinal:  Abdomen is soft, nondistended, and nontender. There are scars visible from previous operations, including  well healed trocar incisions from her TAHBSO..   Musculoskeletal:  Good strength in all 4 extremities.  There is good range of motion in both shoulders.   Skin:  No new skin lesions or rashes on the skin excluding the breast (see breast exam above).        Imagin12        [facility]        Mammo Screening Bilateral        Mirtha Mckay   BIRADS 2    13      [facility]       MR breast Bilateral        Mirtha Mckay  There is severs bilateral background parenchymal enhancement.   There is no discrete mass or focal enhancing mass.   There is no non-mass-like enhancement identified. There is no abnormal lymphadenopathy.  IMPRESSION: No evidence to suggest malignancy.  MRI Benign-BIRADS 2        Pathology:  11/15/2013  BILATERAL TOTAL MASTECTOMY- RISK REDUCING BILAT DEEP AXILLARY SENTINEL  NODE BIOPSY  Final Diagnosis  1:  SENTINEL LYMPH NODE #1, LEFT AXILLA, EXCISIONAL SPECIMEN (ONE NODE):       NO TUMOR IDENTIFIED.    2:  SENTINEL LYMPH NODE #2, LEFT AXILLA, EXCISIONAL SPECIMEN (ONE NODE):       NO TUMOR IDENTIFIED.    3:  SENTINEL LYMPH NODE #1, RIGHT AXILLA, EXCISIONAL SPECIMEN (TWO  "NODES):       SMALLER NODE: NO TUMOR IDENTIFIED.            LARGER NODE: NO TUMOR IDENTIFIED.    4:  SENTINEL LYMPH NODE #2, RIGHT AXILLA, EXCISIONAL SPECIMEN (TWO NODES):       SMALLER NODE: NO TUMOR IDENTIFIED.       LARGER NODE: NO TUMOR IDENTIFIED.    COMMENT: Step sections and routine stains have been utilized on all nodes.     Procedures/Addenda  SUPPLEMENTAL REPORT     Date Ordered:     11/15/2013     Status:  Signed Out       Date Complete:     11/15/2013     By:  TONNY MOON       Date Reported:     2013             Addendum Diagnosis  5: \"RISK REDUCING RIGHT TOTAL MASTECTOMY\":       MINIMAL FIBROSIS.       NEGATIVE SKIN / NIPPLE.    6 \"RISK REDUCING LEFT TOTAL MASTECTOMY\":       MINIMAL FIBROSIS.       NEGATIVE SKIN / NIPPLE.    Labs:    12      [facility]     Comprehensive BRACAnalysis        Mirtha Mckay   NO MUTATION DETECTED    12      [facility]         BRACAnalysis Rearrangement Test      Mirtha Mckay   POSITIVE FOR A DELETERIOUS MUTATION  BRCA2 Full gene rearrangement Deleterious      Procedures:      Assessment:   Diagnosis Plan   1. BRCA2 genetic carrier     2. FH: breast cancer          1-  BRCA 2 rearrangement deleterious mutation 12. Pathology from 13 bilateral risk reducing mastectomy returned as bilateral benign breast tissue , bilateral negative sentinel nodes, LEFT 0/2, RIGHT 0/4.  Reconstruction Dr. Banks, gel implants and NAC creation- tattooing by   - BAUTISTA, Dr. Rubio    2-  mother, age 27,  from this at age 32        Plan:  Mirtha Mckay and I reviewed her interval history, exam,  together today. Her exam is in good order.    There is no evidence of disease based on examination or by symptoms.  Today is her nearly 7 year postoperative visit.     At this juncture, we will arrange for her to return in 1 year for our routine surveillance based on examination and symptoms.  We will have her see Mrs. Shirley Romero our nurse " practitioner.  I asked her to continue her self exam and to call us in the interim with concerns would be happy to see her back sooner.     She has had her uterus and ovaries removed. I did ask her to followup with Dr Rubio for her ovarian-primary peritoneal cancer risk.      Zoya Vásquez MD        Next Appointment:  Return in about 1 year (around 9/1/2021) for no imaging, with Shirley Cervanteser.      EMR Dragon/transcription disclaimer:    Much of this encounter note is an electronic transcription/translocation of spoken language to printed text.  The electronic translation of spoken language may permit erroneous, or at times, nonsensical words or phrases to be inadvertently transcribed.  Although I have reviewed the note from such areas, some may still exist.

## 2021-09-13 PROBLEM — Z90.13 ACQUIRED ABSENCE OF BREAST AND ABSENT NIPPLE, BILATERAL: Status: ACTIVE | Noted: 2021-09-13

## 2022-10-20 NOTE — PROGRESS NOTES
Chief Complaint: Mirtha Mckay is a 36 y.o. female who was seen in consultation at the request of  Martina Rubio DO   for BRCA2 genetic carrier  and a followup visit    History of Present Illness:  Patient presents with a newly diagnosed  mutation in BRCA 2 gene.  This is a mutation caused by a rearrangement in the gene.   She has noted no masses, skin changes, nipple discharge, nipple changes prior to her most recent imaging in either breast  Her most recent imaging includes:  12        [facility]        Mammo Screening Bilateral        Mirtha Mckay   BIRADS 2    13      [facility]       MR breast Bilateral        Mirtha Mckay  There is severs bilateral background parenchymal enhancement.   There is no discrete mass or focal enhancing mass.   There is no non-mass-like enhancement identified. There is no abnormal lymphadenopathy.  IMPRESSION: No evidence to suggest malignancy.  MRI Benign-BIRADS 2    She has not had  a breast biopsy in the past.  She has had her uterus and ovaries removed this year in  , is postmenopausal, and takes no hormones.  Her family history includes the following:  She has one daughter, one sister, one brother, one maternal aunt, 2 maternal uncles, no paternal aunts. Her sister has had testing (Kandy Lima) that is pending, her mother had breast cancer at age 27 and  from this at age 31. Mirtha was 8 when her mother .  Her MA had some sort of cancer after her mother had breast cancer, but she is unsure what kind. Her MGM also had some sort of cancer, but she is unsure as to what kind. One maternal uncle lived in [facility] and is , she is unsure of cause. The other uncle has not had cancer that she is aware of. Her brother has not yet had testing and is unsure as to whether he is interested in this.    No other breast or ovarian cancer in her family.    Since our last visit, Mrs. Mckay has done well. She has noted no changes in her breast exam. No  new masses, skin changes nipple changes, nipple discharge either breast.  She met with Dr Lyles this morning and they discussed bilateral expander reconstruction. SHe liked him quite a bit and they discussed the date of 11-14-13.  No new imaging.  Her sister was tested and found to be negative for mutation.      We went to the operating room on 11-14-13. Pathology from 11-14-13 bilateral risk reducing mastectomy returned as bilateral benign breast tissue , bilateral negative sentinel nodes, LEFT 0/2, RIGHT 0/4.   She reports taking tylenol only after several days.     In the interim, Mrs. Rodriguez has done well. She has had her expanders fully expanded.   She has noted no skin nodules or discolorations.    In the interim,  Mirtha  has done well.  She had her expanders replaced with implants in the summer, then had her NAC reconstruction 2-2016.  She has noted no non-surgical changes in her reconstructed breast exam. No new masses, skin changes,  nodules or discolor ations either breast.   She denies headache, bone pain, belly pain, cough, changes in vision or gait.  She has intentionally lost 10#.      In the interim,  Mirtha Mckay  has done well.  She has noted no changes in her reconstructed breast exam. No new masses, skin changes, nodules or discolorations either side.  She denies headache, bone pain, belly pain, cough, changes in vision or gait.    She has gained 10 pounds.    Her nipple tattooing has faded.    In the interim,  Mirtha Mckay  has done well.  She has noted no changes in her reconstructed breast exam. No new masses, skin changes, either side.   She denies headache, bone pain, belly pain, cough, changes in vision or gait.She     She has gained 9 # in the interim, but tells me that this is 4# down from her heaviest.      In the interim,  Mirtha Mckay  has done well. She had her NAC tattoed at a tattoo parlor in [facility] called [employer], artist named Christian.    She has noted no  changes in her reconstructed breast exam. No new masses, nodules or discolorations either side.    She denies headache, bone pain, belly pain, cough, changes in vision or gait.          In the interim,  Mirtha Mckay  has done well.  She has noted no changes in her reconstructed breast exam. No new masses, nodules or discolorations either side.    She denies headache, bone pain, belly pain, cough, changes in vision or gait.  SHe has gained 7#.    9/13/2021  In the interim,  Mirtha Mckay  has done well.  She has noted no changes in her reconstructed breast exam. No new masses, nodules or discolorations either side.    She denies headache, bone pain, belly pain, cough, changes in vision or gait.  She has lost 4 #.    10/21/2022 Interval History  Here for routine follow up. No new breast concerns. 9 years out from surgery.   No problems to report today.       Review of Systems:  Review of Systems   Constitutional: Negative for unexpected weight change (4 LB WT LOSS).   All other systems reviewed and are negative.       Past Medical and Surgical History:  Breast Biopsy History:  Patient had not had a breast biopsy prior to her cancer diagnosis. and Patient has not had a breast biopsy in the past.  Breast Cancer HIstory:  Patient does not have a past medical history of breast cancer.  Breast Operations, and year:  As per hpi  Obstetric/Gynecologic History:  Age menstrual periods began:14  Patient is premenopausal  Number of pregnancies:1  Number of live births: 1  Number of abortions or miscarriages: 0  Age of delivery of first child: 23  Patient did not breast feed. and Patient breast fed, for the following lenth of time: pump for 6-8 weeks  Length of time taking birth control pills:1-2 years implanon for 3 years  Patient has never taken hormone replacement      Past Surgical History:   Procedure Laterality Date   • CERVICAL CONE BIOPSY     • CYST REMOVAL     • HYSTERECTOMY  06/06/2013   • TONSILLECTOMY           Past  "Medical History:   Diagnosis Date   • Gene mutation     brca 2 mutation       Prior Hospitalizations, other than for surgery or childbirth, and year:  none      Social History     Socioeconomic History   • Marital status:    Tobacco Use   • Smoking status: Never   Substance and Sexual Activity   • Alcohol use: No   • Drug use: No   • Sexual activity: Yes     Partners: Male     Patient is .  Patient is employed full time with the following occupation: childcare at   Patient drinks 3-4 servings of caffeine per day.      Family History:  Family History   Problem Relation Age of Onset   • Breast cancer Mother 27   • Lung cancer Mother    • Hypertension Father    • Hypertension Sister    • Cancer Maternal Grandmother    • Stroke Paternal Grandmother    • Diabetes Other    • Cancer Maternal Aunt        Vital Signs:  /70 (BP Location: Right arm, Patient Position: Sitting, Cuff Size: Adult)   Pulse 101   Ht 160 cm (63\")   Wt 86.2 kg (190 lb)   SpO2 99%   BMI 33.66 kg/m²      Medications:    Current Outpatient Medications:   •  omeprazole (PriLOSEC) 20 MG capsule, , Disp: , Rfl:      Allergies:  No Known Allergies    Physical Examination:  /70 (BP Location: Right arm, Patient Position: Sitting, Cuff Size: Adult)   Pulse 101   Ht 160 cm (63\")   Wt 86.2 kg (190 lb)   SpO2 99%   BMI 33.66 kg/m²   General Appearance:  Patient is in no distress.  She is well kept and has a  average  build.   Psychiatric:  Patient with appropriate mood and affect. Alert and oriented to self, time, and place.    Breast, RIGHT: surgically absent with implants in place. Mastectomy flaps well healed and well perfused. No nodules or discolorations on the skin.  Nipple areolar complex is reconstructed and tattooed. She has a color/pigment  tattoo on the recontructed NAC, but also an intricate floral tattoo hiding her inferior incisions. Dr Lyles did her NAC reconstruction and a  did the " pigment.    Breast, LEFT:  surgically absent with implants  in place. Mastectomy flaps well healed and well perfused. No nodules or discolorations on the skin.  Nipple areolar complex is reconstructed and tattooed. She has a color/pigment  tattoo on the recontructed NAC, but also an intricate floral tattoo hiding her inferior incisions. Dr Lyles did her NAC reconstruction and a  did the pigment.    Lymphatic:  There is no axillary, cervical, infraclavicular, or supraclavicular adenopathy bilaterally.  Eyes:  Pupils are round and reactive to light.  Cardiovascular:  Heart rate and rhythm are regular.  Respiratory:  Lungs are clear bilaterally with no crackles or wheezes in any lung field.  Gastrointestinal:  Abdomen is soft, nondistended, and nontender. There are scars visible from previous operations, including  well healed trocar incisions from her TAHBSO..   Musculoskeletal:  Good strength in all 4 extremities.  There is good range of motion in both shoulders.   Skin:  No new skin lesions or rashes on the skin excluding the breast (see breast exam above).        Imagin12        [facility]        Mammo Screening Bilateral        Mirtha Mckay   BIRADS 2    13      [facility]       MR breast Bilateral        Mirtha Mckay  There is severs bilateral background parenchymal enhancement.   There is no discrete mass or focal enhancing mass.   There is no non-mass-like enhancement identified. There is no abnormal lymphadenopathy.  IMPRESSION: No evidence to suggest malignancy.  MRI Benign-BIRADS 2        Pathology:  11/15/2013  BILATERAL TOTAL MASTECTOMY- RISK REDUCING BILAT DEEP AXILLARY SENTINEL  NODE BIOPSY  Final Diagnosis  1:  SENTINEL LYMPH NODE #1, LEFT AXILLA, EXCISIONAL SPECIMEN (ONE NODE):       NO TUMOR IDENTIFIED.    2:  SENTINEL LYMPH NODE #2, LEFT AXILLA, EXCISIONAL SPECIMEN (ONE NODE):       NO TUMOR IDENTIFIED.    3:  SENTINEL LYMPH NODE #1, RIGHT AXILLA, EXCISIONAL SPECIMEN (TWO  "NODES):       SMALLER NODE: NO TUMOR IDENTIFIED.            LARGER NODE: NO TUMOR IDENTIFIED.    4:  SENTINEL LYMPH NODE #2, RIGHT AXILLA, EXCISIONAL SPECIMEN (TWO NODES):       SMALLER NODE: NO TUMOR IDENTIFIED.       LARGER NODE: NO TUMOR IDENTIFIED.    COMMENT: Step sections and routine stains have been utilized on all nodes.     Procedures/Addenda  SUPPLEMENTAL REPORT     Date Ordered:     11/15/2013     Status:  Signed Out       Date Complete:     11/15/2013     By:  TONNY MOON       Date Reported:     2013             Addendum Diagnosis  5: \"RISK REDUCING RIGHT TOTAL MASTECTOMY\":       MINIMAL FIBROSIS.       NEGATIVE SKIN / NIPPLE.    6 \"RISK REDUCING LEFT TOTAL MASTECTOMY\":       MINIMAL FIBROSIS.       NEGATIVE SKIN / NIPPLE.    Labs:    12      [facility]     Comprehensive BRACAnalysis        Mirtha Mckay   NO MUTATION DETECTED    12      [facility]         BRACAnalysis Rearrangement Test      Mirtha Mckay   POSITIVE FOR A DELETERIOUS MUTATION  BRCA2 Full gene rearrangement Deleterious      Procedures:      Assessment:   Diagnosis Plan   1. BRCA2 genetic carrier        2. FH: breast cancer        3. Acquired absence of breast and absent nipple, bilateral             1-  BRCA 2 rearrangement deleterious mutation 12. Pathology from 13 bilateral risk reducing mastectomy returned as bilateral benign breast tissue , bilateral negative sentinel nodes, LEFT 0/2, RIGHT 0/4.  Reconstruction Dr. Banks, gel implants and NAC creation- tattooing by   - Dr. Ramiro BAUM    2-  mother, age 27,  from this at age 31        Plan:  rto in 1 year for exam  Monthly self exams  rto if any new breast concerns    [facility]        Next Appointment:  Return in about 1 year (around 10/21/2023) for Recheck.      EMR Dragon/transcription disclaimer:    Much of this encounter note is an electronic transcription/translocation of spoken language to printed text.  The " electronic translation of spoken language may permit erroneous, or at times, nonsensical words or phrases to be inadvertently transcribed.  Although I have reviewed the note from such areas, some may still exist.

## 2022-10-21 ENCOUNTER — OFFICE VISIT (OUTPATIENT)
Dept: SURGERY | Facility: CLINIC | Age: 37
End: 2022-10-21

## 2022-10-21 VITALS
WEIGHT: 190 LBS | DIASTOLIC BLOOD PRESSURE: 70 MMHG | BODY MASS INDEX: 33.66 KG/M2 | OXYGEN SATURATION: 99 % | HEIGHT: 63 IN | HEART RATE: 101 BPM | SYSTOLIC BLOOD PRESSURE: 124 MMHG

## 2022-10-21 DIAGNOSIS — Z90.13 ACQUIRED ABSENCE OF BREAST AND ABSENT NIPPLE, BILATERAL: ICD-10-CM

## 2022-10-21 DIAGNOSIS — Z15.01 BRCA2 GENETIC CARRIER: Primary | ICD-10-CM

## 2022-10-21 DIAGNOSIS — Z15.09 BRCA2 GENETIC CARRIER: Primary | ICD-10-CM

## 2022-10-21 DIAGNOSIS — Z80.3 FH: BREAST CANCER: ICD-10-CM

## 2022-10-21 PROCEDURE — 99213 OFFICE O/P EST LOW 20 MIN: CPT | Performed by: NURSE PRACTITIONER

## 2023-10-26 ENCOUNTER — TELEPHONE (OUTPATIENT)
Dept: SURGERY | Facility: CLINIC | Age: 38
End: 2023-10-26
Payer: COMMERCIAL

## 2023-10-26 NOTE — TELEPHONE ENCOUNTER
Lvm for pt to let her know bipin angulo schedule is changing and I changed her appt on the 30th from 8am to 940    I told pt if she wants to rsch to give me a call back

## 2023-10-26 NOTE — PROGRESS NOTES
Chief Complaint: Mirtha Mckay is a 37 y.o. female who was seen in consultation at the request of  Martina Rubio DO   for  BRCA2 genetic carrier  and a followup visit    History of Present Illness:  Patient presents with a newly diagnosed  mutation in BRCA 2 gene.  This is a mutation caused by a rearrangement in the gene.   She has noted no masses, skin changes, nipple discharge, nipple changes prior to her most recent imaging in either breast  Her most recent imaging includes:  12        [facility]        Mammo Screening Bilateral        Mirtha Mckay   BIRADS 2    13      [facility]       MR breast Bilateral        Mirtha Mckay  There is severs bilateral background parenchymal enhancement.   There is no discrete mass or focal enhancing mass.   There is no non-mass-like enhancement identified. There is no abnormal lymphadenopathy.  IMPRESSION: No evidence to suggest malignancy.  MRI Benign-BIRADS 2    She has not had  a breast biopsy in the past.  She has had her uterus and ovaries removed this year in  , is postmenopausal, and takes no hormones.  Her family history includes the following:  She has one daughter, one sister, one brother, one maternal aunt, 2 maternal uncles, no paternal aunts. Her sister has had testing (Kandy Lima) that is pending, her mother had breast cancer at age 27 and  from this at age 31. Mirtha was 8 when her mother .  Her MA had some sort of cancer after her mother had breast cancer, but she is unsure what kind. Her MGM also had some sort of cancer, but she is unsure as to what kind. One maternal uncle lived in [facility] and is , she is unsure of cause. The other uncle has not had cancer that she is aware of. Her brother has not yet had testing and is unsure as to whether he is interested in this.    No other breast or ovarian cancer in her family.    Since our last visit, Mrs. Mckay has done well. She has noted no changes in her breast exam.  No new masses, skin changes nipple changes, nipple discharge either breast.  She met with Dr Lyles this morning and they discussed bilateral expander reconstruction. SHe liked him quite a bit and they discussed the date of 11-14-13.  No new imaging.  Her sister was tested and found to be negative for mutation.      We went to the operating room on 11-14-13. Pathology from 11-14-13 bilateral risk reducing mastectomy returned as bilateral benign breast tissue , bilateral negative sentinel nodes, LEFT 0/2, RIGHT 0/4.   She reports taking tylenol only after several days.     In the interim, Mrs. Rodriguez has done well. She has had her expanders fully expanded.   She has noted no skin nodules or discolorations.    In the interim,  Mirtha  has done well.  She had her expanders replaced with implants in the summer, then had her NAC reconstruction 2-2016.  She has noted no non-surgical changes in her reconstructed breast exam. No new masses, skin changes,  nodules or discolor ations either breast.   She denies headache, bone pain, belly pain, cough, changes in vision or gait.  She has intentionally lost 10#.      In the interim,  Mirtha Mckay  has done well.  She has noted no changes in her reconstructed breast exam. No new masses, skin changes, nodules or discolorations either side.  She denies headache, bone pain, belly pain, cough, changes in vision or gait.    She has gained 10 pounds.    Her nipple tattooing has faded.    In the interim,  Mirtha Mckay  has done well.  She has noted no changes in her reconstructed breast exam. No new masses, skin changes, either side.   She denies headache, bone pain, belly pain, cough, changes in vision or gait.She     She has gained 9 # in the interim, but tells me that this is 4# down from her heaviest.      In the interim,  Mirtha Mckay  has done well. She had her NAC tattoed at a tattoo parlor in [facility] called [facility], artist named Christian.    She has noted no  changes in her reconstructed breast exam. No new masses, nodules or discolorations either side.    She denies headache, bone pain, belly pain, cough, changes in vision or gait.          In the interim,  Mirtha Mckay  has done well.  She has noted no changes in her reconstructed breast exam. No new masses, nodules or discolorations either side.    She denies headache, bone pain, belly pain, cough, changes in vision or gait.  SHe has gained 7#.    9/13/2021  In the interim,  Mirtha Mckay  has done well.  She has noted no changes in her reconstructed breast exam. No new masses, nodules or discolorations either side.    She denies headache, bone pain, belly pain, cough, changes in vision or gait.  She has lost 4 #.    10/21/2022 Interval History  Here for routine follow up. No new breast concerns. 9 years out from surgery.   No problems to report today.     10/27/2023 Interval History  Patient presenting to the office today for routine follow-up.  She has no new breast complaints or concerns today.  10 years out from surgery      Review of Systems:  Review of Systems   Constitutional:  Negative for unexpected weight change (4 LB WT LOSS).   All other systems reviewed and are negative.       Past Medical and Surgical History:  Breast Biopsy History:  Patient had not had a breast biopsy prior to her cancer diagnosis. and Patient has not had a breast biopsy in the past.  Breast Cancer HIstory:  Patient does not have a past medical history of breast cancer.  Breast Operations, and year:  As per hpi  Obstetric/Gynecologic History:  Age menstrual periods began:14  Patient is premenopausal  Number of pregnancies:1  Number of live births: 1  Number of abortions or miscarriages: 0  Age of delivery of first child: 23  Patient did not breast feed. and Patient breast fed, for the following lenth of time: pump for 6-8 weeks  Length of time taking birth control pills:1-2 years implanon for 3 years  Patient has never taken hormone  replacement      Past Surgical History:   Procedure Laterality Date    CERVICAL CONE BIOPSY      CYST REMOVAL      HYSTERECTOMY  06/06/2013    TONSILLECTOMY           Past Medical History:   Diagnosis Date    Gene mutation     brca 2 mutation       Prior Hospitalizations, other than for surgery or childbirth, and year:  none      Social History     Socioeconomic History    Marital status:    Tobacco Use    Smoking status: Never   Substance and Sexual Activity    Alcohol use: No    Drug use: No    Sexual activity: Yes     Partners: Male     Patient is .  Patient is employed full time with the following occupation: childcare at   Patient drinks 3-4 servings of caffeine per day.      Family History:  Family History   Problem Relation Age of Onset    Breast cancer Mother 27    Lung cancer Mother     Hypertension Father     Hypertension Sister     Cancer Maternal Grandmother     Stroke Paternal Grandmother     Diabetes Other     Cancer Maternal Aunt        Vital Signs:  There were no vitals taken for this visit.     Medications:    Current Outpatient Medications:     omeprazole (PriLOSEC) 20 MG capsule, , Disp: , Rfl:      Allergies:  No Known Allergies    Physical Examination:  There were no vitals taken for this visit.  General Appearance:  Patient is in no distress.  She is well kept and has a  average  build.   Psychiatric:  Patient with appropriate mood and affect. Alert and oriented to self, time, and place.    Breast, RIGHT: surgically absent with implants in place. Mastectomy flaps well healed and well perfused. No nodules or discolorations on the skin.  Nipple areolar complex is reconstructed and tattooed. She has a color/pigment  tattoo on the recontructed NAC, but also an intricate floral tattoo hiding her inferior incisions. Dr Lyles did her NAC reconstruction and a  did the pigment.    Breast, LEFT:  surgically absent with implants  in place. Mastectomy flaps well healed  "and well perfused. No nodules or discolorations on the skin.  Nipple areolar complex is reconstructed and tattooed. She has a color/pigment  tattoo on the recontructed NAC, but also an intricate floral tattoo hiding her inferior incisions. Dr Lyles did her NAC reconstruction and a  did the pigment.      Imagin12        [facility]        Mammo Screening Bilateral        Mirtha Mckay   BIRADS 2    13      [facility]       MR breast Bilateral        Mirtha Mckay  There is severs bilateral background parenchymal enhancement.   There is no discrete mass or focal enhancing mass.   There is no non-mass-like enhancement identified. There is no abnormal lymphadenopathy.  IMPRESSION: No evidence to suggest malignancy.  MRI Benign-BIRADS 2        Pathology:  11/15/2013  BILATERAL TOTAL MASTECTOMY- RISK REDUCING BILAT DEEP AXILLARY SENTINEL  NODE BIOPSY  Final Diagnosis  1:  SENTINEL LYMPH NODE #1, LEFT AXILLA, EXCISIONAL SPECIMEN (ONE NODE):       NO TUMOR IDENTIFIED.    2:  SENTINEL LYMPH NODE #2, LEFT AXILLA, EXCISIONAL SPECIMEN (ONE NODE):       NO TUMOR IDENTIFIED.    3:  SENTINEL LYMPH NODE #1, RIGHT AXILLA, EXCISIONAL SPECIMEN (TWO NODES):       SMALLER NODE: NO TUMOR IDENTIFIED.            LARGER NODE: NO TUMOR IDENTIFIED.    4:  SENTINEL LYMPH NODE #2, RIGHT AXILLA, EXCISIONAL SPECIMEN (TWO NODES):       SMALLER NODE: NO TUMOR IDENTIFIED.       LARGER NODE: NO TUMOR IDENTIFIED.    COMMENT: Step sections and routine stains have been utilized on all nodes.     Procedures/Addenda  SUPPLEMENTAL REPORT     Date Ordered:     11/15/2013     Status:  Signed Out       Date Complete:     11/15/2013     By:  TONNY MOON       Date Reported:     2013             Addendum Diagnosis  5: \"RISK REDUCING RIGHT TOTAL MASTECTOMY\":       MINIMAL FIBROSIS.       NEGATIVE SKIN / NIPPLE.    6 \"RISK REDUCING LEFT TOTAL MASTECTOMY\":       MINIMAL FIBROSIS.       NEGATIVE SKIN / NIPPLE.    Labs:    12 "      [facility]     Comprehensive BRACAnalysis        Mirtha Mckay   NO MUTATION DETECTED    12      [facility]         BRACAnalysis Rearrangement Test      Mirtha Mckay   POSITIVE FOR A DELETERIOUS MUTATION  BRCA2 Full gene rearrangement Deleterious      Assessment:  No diagnosis found.       1-  BRCA 2 rearrangement deleterious mutation 12. Pathology from 13 bilateral risk reducing mastectomy returned as bilateral benign breast tissue , bilateral negative sentinel nodes, LEFT 0/2, RIGHT 0/4.  Reconstruction Dr. Banks, gel implants and NAC creation- tattooing by   - BAUTISTA, Dr. Rubio    2-  mother, age 27,  from this at age 31        Plan:  rto in 1 year for exam  Monthly self exams  rto if any new breast concerns    [facility] Dragon/transcription disclaimer:    Much of this encounter note is an electronic transcription/translocation of spoken language to printed text.  The electronic translation of spoken language may permit erroneous, or at times, nonsensical words or phrases to be inadvertently transcribed.  Although I have reviewed the note from such areas, some may still exist.

## 2023-10-30 ENCOUNTER — OFFICE VISIT (OUTPATIENT)
Dept: SURGERY | Facility: CLINIC | Age: 38
End: 2023-10-30
Payer: COMMERCIAL

## 2023-10-30 VITALS
BODY MASS INDEX: 32.78 KG/M2 | OXYGEN SATURATION: 98 % | HEIGHT: 63 IN | DIASTOLIC BLOOD PRESSURE: 78 MMHG | WEIGHT: 185 LBS | HEART RATE: 64 BPM | SYSTOLIC BLOOD PRESSURE: 124 MMHG

## 2023-10-30 DIAGNOSIS — Z90.13 ACQUIRED ABSENCE OF BREAST AND ABSENT NIPPLE, BILATERAL: ICD-10-CM

## 2023-10-30 DIAGNOSIS — Z80.3 FH: BREAST CANCER: ICD-10-CM

## 2023-10-30 DIAGNOSIS — Z15.01 BRCA2 GENETIC CARRIER: Primary | ICD-10-CM

## 2023-10-30 DIAGNOSIS — Z15.09 BRCA2 GENETIC CARRIER: Primary | ICD-10-CM

## 2023-10-30 PROCEDURE — 99213 OFFICE O/P EST LOW 20 MIN: CPT | Performed by: NURSE PRACTITIONER

## 2024-07-30 ENCOUNTER — TELEPHONE (OUTPATIENT)
Dept: OBSTETRICS AND GYNECOLOGY | Facility: CLINIC | Age: 39
End: 2024-07-30
Payer: COMMERCIAL

## 2024-10-22 NOTE — PROGRESS NOTES
Chief Complaint: Mirtha Mckay is a 38 y.o. female who was seen in consultation at the request of  Martina Rubio DO   for  BRCA2 genetic carrier  and a followup visit    History of Present Illness:  Patient presents with a newly diagnosed  mutation in BRCA 2 gene.  This is a mutation caused by a rearrangement in the gene.   She has noted no masses, skin changes, nipple discharge, nipple changes prior to her most recent imaging in either breast  Her most recent imaging includes:  12        [facility]        Mammo Screening Bilateral        Mirtha Mckay   BIRADS 2    13      [facility]       MR breast Bilateral        Mirtha Mckay  There is severs bilateral background parenchymal enhancement.   There is no discrete mass or focal enhancing mass.   There is no non-mass-like enhancement identified. There is no abnormal lymphadenopathy.  IMPRESSION: No evidence to suggest malignancy.  MRI Benign-BIRADS 2    She has not had  a breast biopsy in the past.  She has had her uterus and ovaries removed this year in  , is postmenopausal, and takes no hormones.  Her family history includes the following:  She has one daughter, one sister, one brother, one maternal aunt, 2 maternal uncles, no paternal aunts. Her sister has had testing (Kandy Lima) that is pending, her mother had breast cancer at age 27 and  from this at age 31. Mirtha was 8 when her mother .  Her MA had some sort of cancer after her mother had breast cancer, but she is unsure what kind. Her MGM also had some sort of cancer, but she is unsure as to what kind. One maternal uncle lived in [facility] and is , she is unsure of cause. The other uncle has not had cancer that she is aware of. Her brother has not yet had testing and is unsure as to whether he is interested in this.    No other breast or ovarian cancer in her family.    Since our last visit, Mrs. Mckay has done well. She has noted no changes in her breast exam.  No new masses, skin changes nipple changes, nipple discharge either breast.  She met with Dr Lyles this morning and they discussed bilateral expander reconstruction. SHe liked him quite a bit and they discussed the date of 11-14-13.  No new imaging.  Her sister was tested and found to be negative for mutation.      We went to the operating room on 11-14-13. Pathology from 11-14-13 bilateral risk reducing mastectomy returned as bilateral benign breast tissue , bilateral negative sentinel nodes, LEFT 0/2, RIGHT 0/4.   She reports taking tylenol only after several days.     In the interim, Mrs. Rodriguez has done well. She has had her expanders fully expanded.   She has noted no skin nodules or discolorations.    In the interim,  Mirtha  has done well.  She had her expanders replaced with implants in the summer, then had her NAC reconstruction 2-2016.  She has noted no non-surgical changes in her reconstructed breast exam. No new masses, skin changes,  nodules or discolor ations either breast.   She denies headache, bone pain, belly pain, cough, changes in vision or gait.  She has intentionally lost 10#.      In the interim,  Mirtha Mckay  has done well.  She has noted no changes in her reconstructed breast exam. No new masses, skin changes, nodules or discolorations either side.  She denies headache, bone pain, belly pain, cough, changes in vision or gait.    She has gained 10 pounds.    Her nipple tattooing has faded.    In the interim,  Mirtha Mckay  has done well.  She has noted no changes in her reconstructed breast exam. No new masses, skin changes, either side.   She denies headache, bone pain, belly pain, cough, changes in vision or gait.She     She has gained 9 # in the interim, but tells me that this is 4# down from her heaviest.      In the interim,  Mirtha Mckay  has done well. She had her NAC tattoed at a tattoo parlor in [facility] called [facility], artist named Christian.    She has noted no  changes in her reconstructed breast exam. No new masses, nodules or discolorations either side.    She denies headache, bone pain, belly pain, cough, changes in vision or gait.          In the interim,  Mirtha Mckay  has done well.  She has noted no changes in her reconstructed breast exam. No new masses, nodules or discolorations either side.    She denies headache, bone pain, belly pain, cough, changes in vision or gait.  SHe has gained 7#.    9/13/2021  In the interim,  Mirtha Mckay  has done well.  She has noted no changes in her reconstructed breast exam. No new masses, nodules or discolorations either side.    She denies headache, bone pain, belly pain, cough, changes in vision or gait.  She has lost 4 #.    10/21/2022   Here for routine follow up. No new breast concerns. 9 years out from surgery.   No problems to report today.     10/27/2023   Patient presenting to the office today for routine follow-up.  She has no new breast complaints or concerns today.  10 years out from surgery    10/28/2024 interval history  Patient presenting to the office today for routine follow-up.  She has no new breast complaints or concerns today.  She is 11 years out from her surgery.    Review of Systems:  Review of Systems   Constitutional:  Negative for unexpected weight change (4 LB WT LOSS).   All other systems reviewed and are negative.       Past Medical and Surgical History:  Breast Biopsy History:  Patient had not had a breast biopsy prior to her cancer diagnosis. and Patient has not had a breast biopsy in the past.  Breast Cancer HIstory:  Patient does not have a past medical history of breast cancer.  Breast Operations, and year:  As per hpi  Obstetric/Gynecologic History:  Age menstrual periods began:14  Patient is premenopausal  Number of pregnancies:1  Number of live births: 1  Number of abortions or miscarriages: 0  Age of delivery of first child: 23  Patient did not breast feed. and Patient breast fed, for the  "following lenth of time: pump for 6-8 weeks  Length of time taking birth control pills:1-2 years implanon for 3 years  Patient has never taken hormone replacement      Past Surgical History:   Procedure Laterality Date    CERVICAL CONE BIOPSY      CYST REMOVAL      HYSTERECTOMY  06/06/2013    TONSILLECTOMY           Past Medical History:   Diagnosis Date    Gene mutation     brca 2 mutation       Prior Hospitalizations, other than for surgery or childbirth, and year:  none      Social History     Socioeconomic History    Marital status:    Tobacco Use    Smoking status: Never     Passive exposure: Never   Substance and Sexual Activity    Alcohol use: No    Drug use: No    Sexual activity: Yes     Partners: Male     Patient is .  Patient is employed full time with the following occupation: childcare at   Patient drinks 3-4 servings of caffeine per day.      Family History:  Family History   Problem Relation Age of Onset    Breast cancer Mother 27    Lung cancer Mother     Hypertension Father     Hypertension Sister     Cancer Maternal Grandmother     Stroke Paternal Grandmother     Diabetes Other     Cancer Maternal Aunt        Vital Signs:  /82   Pulse 91   Ht 160 cm (62.99\")   Wt 83 kg (183 lb)   SpO2 98%   BMI 32.43 kg/m²      Medications:    Current Outpatient Medications:     omeprazole (PriLOSEC) 20 MG capsule, , Disp: , Rfl:      Allergies:  No Known Allergies    Physical Examination:  /82   Pulse 91   Ht 160 cm (62.99\")   Wt 83 kg (183 lb)   SpO2 98%   BMI 32.43 kg/m²   General Appearance:  Patient is in no distress.  She is well kept and has a  average  build.   Psychiatric:  Patient with appropriate mood and affect. Alert and oriented to self, time, and place.    Breast, RIGHT: surgically absent with implants in place. Mastectomy flaps well healed and well perfused. No nodules or discolorations on the skin.  Nipple areolar complex is reconstructed and tattooed. She " has a color/pigment  tattoo on the recontructed NAC, but also an intricate floral tattoo hiding her inferior incisions. Dr Lyles did her NAC reconstruction and a  did the pigment.    Breast, LEFT:  surgically absent with implants  in place. Mastectomy flaps well healed and well perfused. No nodules or discolorations on the skin.  Nipple areolar complex is reconstructed and tattooed. She has a color/pigment  tattoo on the recontructed NAC, but also an intricate floral tattoo hiding her inferior incisions. Dr Lyles did her NAC reconstruction and a  did the pigment.      Imagin12        [facility]        Mammo Screening Bilateral        Mirtha Mckay   BIRADS 2    13      [facility]       MR breast Bilateral        Mirtha Mckay  There is severs bilateral background parenchymal enhancement.   There is no discrete mass or focal enhancing mass.   There is no non-mass-like enhancement identified. There is no abnormal lymphadenopathy.  IMPRESSION: No evidence to suggest malignancy.  MRI Benign-BIRADS 2        Pathology:  11/15/2013  BILATERAL TOTAL MASTECTOMY- RISK REDUCING BILAT DEEP AXILLARY SENTINEL  NODE BIOPSY  Final Diagnosis  1:  SENTINEL LYMPH NODE #1, LEFT AXILLA, EXCISIONAL SPECIMEN (ONE NODE):       NO TUMOR IDENTIFIED.    2:  SENTINEL LYMPH NODE #2, LEFT AXILLA, EXCISIONAL SPECIMEN (ONE NODE):       NO TUMOR IDENTIFIED.    3:  SENTINEL LYMPH NODE #1, RIGHT AXILLA, EXCISIONAL SPECIMEN (TWO NODES):       SMALLER NODE: NO TUMOR IDENTIFIED.            LARGER NODE: NO TUMOR IDENTIFIED.    4:  SENTINEL LYMPH NODE #2, RIGHT AXILLA, EXCISIONAL SPECIMEN (TWO NODES):       SMALLER NODE: NO TUMOR IDENTIFIED.       LARGER NODE: NO TUMOR IDENTIFIED.    COMMENT: Step sections and routine stains have been utilized on all nodes.     Procedures/Addenda  SUPPLEMENTAL REPORT     Date Ordered:     11/15/2013     Status:  Signed Out       Date Complete:     11/15/2013     By:  RED  "TONNY       Date Reported:     2013             Addendum Diagnosis  5: \"RISK REDUCING RIGHT TOTAL MASTECTOMY\":       MINIMAL FIBROSIS.       NEGATIVE SKIN / NIPPLE.    6 \"RISK REDUCING LEFT TOTAL MASTECTOMY\":       MINIMAL FIBROSIS.       NEGATIVE SKIN / NIPPLE.    Labs:    12      [facility]     Comprehensive BRACAnalysis        Mirtha Mckay   NO MUTATION DETECTED    12      [facility]         BRACAnalysis Rearrangement Test      Mirtha Mckay   POSITIVE FOR A DELETERIOUS MUTATION  BRCA2 Full gene rearrangement Deleterious      Assessment:   Diagnosis Plan   1. BRCA2 genetic carrier               1-  BRCA 2 rearrangement deleterious mutation 12. Pathology from 13 bilateral risk reducing mastectomy returned as bilateral benign breast tissue , bilateral negative sentinel nodes, LEFT 0/2, RIGHT 0/4.  Reconstruction Dr. Banks, gel implants and NAC creation- tattooing by   - BAUTISTA, Dr. Rubio    2-  mother, age 27,  from this at age 31        Plan:  rto in 1 year for exam  Monthly self exams  rto if any new breast concerns    [facility]        EMR Dragon/transcription disclaimer:    Much of this encounter note is an electronic transcription/translocation of spoken language to printed text.  The electronic translation of spoken language may permit erroneous, or at times, nonsensical words or phrases to be inadvertently transcribed.  Although I have reviewed the note from such areas, some may still exist.                "

## 2024-10-28 ENCOUNTER — OFFICE VISIT (OUTPATIENT)
Dept: SURGERY | Facility: CLINIC | Age: 39
End: 2024-10-28
Payer: COMMERCIAL

## 2024-10-28 VITALS
BODY MASS INDEX: 32.43 KG/M2 | HEIGHT: 63 IN | HEART RATE: 91 BPM | WEIGHT: 183 LBS | DIASTOLIC BLOOD PRESSURE: 82 MMHG | SYSTOLIC BLOOD PRESSURE: 132 MMHG | OXYGEN SATURATION: 98 %

## 2024-10-28 DIAGNOSIS — Z15.01 BRCA2 GENETIC CARRIER: Primary | ICD-10-CM

## 2024-10-28 DIAGNOSIS — Z15.09 BRCA2 GENETIC CARRIER: Primary | ICD-10-CM

## 2024-10-28 PROCEDURE — 99213 OFFICE O/P EST LOW 20 MIN: CPT | Performed by: NURSE PRACTITIONER
